# Patient Record
Sex: FEMALE | Race: WHITE
[De-identification: names, ages, dates, MRNs, and addresses within clinical notes are randomized per-mention and may not be internally consistent; named-entity substitution may affect disease eponyms.]

---

## 2019-02-18 ENCOUNTER — HOSPITAL ENCOUNTER (EMERGENCY)
Dept: HOSPITAL 25 - UCCORT | Age: 34
Discharge: HOME | End: 2019-02-18
Payer: COMMERCIAL

## 2019-02-18 VITALS — SYSTOLIC BLOOD PRESSURE: 149 MMHG | DIASTOLIC BLOOD PRESSURE: 85 MMHG

## 2019-02-18 DIAGNOSIS — O99.513: Primary | ICD-10-CM

## 2019-02-18 DIAGNOSIS — O16.3: ICD-10-CM

## 2019-02-18 DIAGNOSIS — Z79.82: ICD-10-CM

## 2019-02-18 DIAGNOSIS — J32.9: ICD-10-CM

## 2019-02-18 DIAGNOSIS — Z3A.35: ICD-10-CM

## 2019-02-18 PROCEDURE — G0463 HOSPITAL OUTPT CLINIC VISIT: HCPCS

## 2019-02-18 PROCEDURE — 99202 OFFICE O/P NEW SF 15 MIN: CPT

## 2019-02-18 NOTE — UC
UC General HPI





- HPI Summary


HPI Summary: 





10 days of worsening sinus pain, pressure and congestion.


post nasal drip causing a sore throat and cough.


taking mucinex with no relief.


+ yellow drainage.


35 weeks pregnant. baby is moving. no vaginal d/c or bleeding.





- History of Current Complaint


Chief Complaint: UCGeneralIllness


Stated Complaint: SINUSES,CONGESTION (35 WKS PREGNANT)


Time Seen by Provider: 02/18/19 15:27


Hx Obtained From: Patient


Onset/Duration: Gradual Onset


Timing: Constant


Pain Intensity: 0


Associated Signs & Symptoms: Positive: Cough.  Negative: Abdominal Pain





- Allergy/Home Medications


Allergies/Adverse Reactions: 


 Allergies











Allergy/AdvReac Type Severity Reaction Status Date / Time


 


No Known Allergies Allergy   Verified 02/18/19 15:13











Home Medications: 


 Home Medications





Acetaminophen [Mapap] 500 mg PO DAILY 02/18/19 [History Confirmed 02/18/19]


Aspirin 81 mg CHEW TAB* 81 mg PO DAILY 02/18/19 [History Confirmed 02/18/19]


Ergocalciferol (Vitamin D2) [Vitamin D2] 1 each PO DAILY 02/18/19 [History 

Confirmed 02/18/19]


Prenatal 123/Iron/Folic/Omeg3s [One-A-Day Prenatal 1 Dha Sfgl] 1 each PO DAILY 

02/18/19 [History Confirmed 02/18/19]


guaiFENesin [Mucinex] 600 mg PO DAILY 02/18/19 [History Confirmed 02/18/19]











PMH/Surg Hx/FS Hx/Imm Hx





- Additional Past Medical History


Additional PMH: 





35 weeks pregnant


Cardiovascular History: Hypertension





- Surgical History


Surgical History: Yes


Surgery Procedure, Year, and Place: NEEDLE REMOVED FROM FOOT.  APPENDECTOMY





- Family History


Known Family History: Positive: Non-Contributory





- Social History


Occupation: Employed Full-time


Alcohol Use: None


Substance Use Type: None


Smoking Status (MU): Never Smoked Tobacco





- Immunization History


Vaccination Up to Date: Yes





Review of Systems


All Other Systems Reviewed And Are Negative: Yes


Constitutional: Positive: Negative


Skin: Positive: Negative


Eyes: Positive: Negative


ENT: Positive: Nasal Discharge, Sinus Congestion, Sinus Pain/Tenderness


Respiratory: Positive: Cough


Cardiovascular: Positive: Negative


Gastrointestinal: Positive: Negative


Genitourinary: Positive: Negative


Motor: Positive: Negative


Neurovascular: Positive: Negative


Musculoskeletal: Positive: Negative


Neurological: Positive: Negative


Psychological: Positive: Negative





Physical Exam


Triage Information Reviewed: Yes


Appearance: Well-Appearing


Vital Signs: 


 Initial Vital Signs











Temp  97.6 F   02/18/19 15:11


 


Pulse  93   02/18/19 15:11


 


Resp  18   02/18/19 15:11


 


BP  149/85   02/18/19 15:11


 


Pulse Ox  99   02/18/19 15:11











Vital Signs Reviewed: Yes


Eyes: Positive: Conjunctiva Clear


ENT: Positive: Pharynx normal, Nasal congestion - with boggy memebranes, TMs 

normal, Sinus tenderness - maxillary


Neck: Positive: Supple, Nontender, No Lymphadenopathy


Respiratory: Positive: Lungs clear, Normal breath sounds


Cardiovascular: Positive: RRR, No Murmur


Abdomen Description: Positive: Nontender, No Organomegaly, Soft


Bowel Sounds: Positive: Present


Musculoskeletal: Positive: ROM Intact


Neurological: Positive: Alert


Psychological: Positive: Age Appropriate Behavior


Skin Exam: Normal





Course/Dx





- Differential Dx - Multi-Symptom


Differential Diagnoses: Other - uri, sinsuitis, bronchitis





- Diagnoses


Provider Diagnosis: 


 Sinusitis








Discharge





- Sign-Out/Discharge


Documenting (check all that apply): Patient Departure


All imaging exams completed and their final reports reviewed: No Studies





- Discharge Plan


Condition: Stable


Disposition: HOME


Prescriptions: 


Amoxicillin/Clavulanate TAB* [Augmentin *] 875 mg PO BID 10 Days #20 tab


Patient Education Materials:  Sinusitis (ED)


Referrals: 


Peña Brown MD [Primary Care Provider] - 7 Days





- Billing Disposition and Condition


Condition: STABLE


Disposition: Home